# Patient Record
Sex: FEMALE | URBAN - METROPOLITAN AREA
[De-identification: names, ages, dates, MRNs, and addresses within clinical notes are randomized per-mention and may not be internally consistent; named-entity substitution may affect disease eponyms.]

---

## 2019-05-17 ENCOUNTER — APPOINTMENT (RX ONLY)
Dept: URBAN - METROPOLITAN AREA CLINIC 116 | Facility: CLINIC | Age: 44
Setting detail: DERMATOLOGY
End: 2019-05-17

## 2019-05-17 DIAGNOSIS — Z41.9 ENCOUNTER FOR PROCEDURE FOR PURPOSES OTHER THAN REMEDYING HEALTH STATE, UNSPECIFIED: ICD-10-CM

## 2019-05-17 PROCEDURE — ? OTHER (COSMETIC)

## 2019-05-17 PROCEDURE — ? COSMETIC CONSULTATION: LHR

## 2019-05-17 NOTE — PROCEDURE: OTHER (COSMETIC)
Other (Free Text): Patient came in for consult for laser hair removal. Previously had 6 treatments on bikini, underarms, and full legs at another facility. These areas are almost hairless. Patientâs legs were tan, she is aware to lose tan before beginning treatment on that area. Gave pricing for bikini, full legs, lower back, and underarms. Patient aware to avoid sun. Patient aware full leg treatment will need to be scheduled on 2 different days (half leg per day).
Detail Level: Detailed